# Patient Record
Sex: FEMALE | Race: BLACK OR AFRICAN AMERICAN | NOT HISPANIC OR LATINO | URBAN - METROPOLITAN AREA
[De-identification: names, ages, dates, MRNs, and addresses within clinical notes are randomized per-mention and may not be internally consistent; named-entity substitution may affect disease eponyms.]

---

## 2018-03-06 ENCOUNTER — OFFICE VISIT (OUTPATIENT)
Dept: INTERNAL MEDICINE | Facility: CLINIC | Age: 43
End: 2018-03-06

## 2018-03-06 VITALS
HEART RATE: 80 BPM | DIASTOLIC BLOOD PRESSURE: 80 MMHG | SYSTOLIC BLOOD PRESSURE: 140 MMHG | OXYGEN SATURATION: 98 % | WEIGHT: 220 LBS | BODY MASS INDEX: 31.57 KG/M2 | TEMPERATURE: 98.5 F

## 2018-03-06 DIAGNOSIS — M25.571 ACUTE RIGHT ANKLE PAIN: ICD-10-CM

## 2018-03-06 DIAGNOSIS — M54.2 NECK PAIN: ICD-10-CM
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
## 2018-03-06 DIAGNOSIS — G44.309 POST-TRAUMATIC HEADACHE, NOT INTRACTABLE, UNSPECIFIED CHRONICITY PATTERN: ICD-10-CM

## 2018-03-06 DIAGNOSIS — M25.512 ACUTE PAIN OF LEFT SHOULDER: ICD-10-CM

## 2018-03-06 PROCEDURE — 99213 OFFICE O/P EST LOW 20 MIN: CPT | Performed by: FAMILY MEDICINE

## 2018-03-06 RX ORDER — IBUPROFEN 800 MG/1
800 TABLET ORAL
COMMUNITY
Start: 2018-02-28 | End: 2018-03-10

## 2018-03-06 RX ORDER — CYCLOBENZAPRINE HCL 10 MG
10 TABLET ORAL
COMMUNITY
Start: 2018-02-28 | End: 2019-02-28
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
## 2018-03-14 ENCOUNTER — TREATMENT (OUTPATIENT)
Dept: PHYSICAL THERAPY | Facility: CLINIC | Age: 43
End: 2018-03-14

## 2018-03-14 DIAGNOSIS — M25.512 ACUTE PAIN OF LEFT SHOULDER: Primary | ICD-10-CM

## 2018-03-14 DIAGNOSIS — M54.2 PAIN, NECK: ICD-10-CM

## 2018-03-14 PROCEDURE — 97110 THERAPEUTIC EXERCISES: CPT | Performed by: PHYSICAL THERAPIST

## 2018-03-14 PROCEDURE — 97014 ELECTRIC STIMULATION THERAPY: CPT | Performed by: PHYSICAL THERAPIST

## 2018-03-14 PROCEDURE — 97161 PT EVAL LOW COMPLEX 20 MIN: CPT | Performed by: PHYSICAL THERAPIST
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
## 2018-03-20 ENCOUNTER — TREATMENT (OUTPATIENT)
Dept: PHYSICAL THERAPY | Facility: CLINIC | Age: 43
End: 2018-03-20

## 2018-03-20 DIAGNOSIS — M25.512 ACUTE PAIN OF LEFT SHOULDER: Primary | ICD-10-CM

## 2018-03-20 DIAGNOSIS — M54.2 PAIN, NECK: ICD-10-CM

## 2018-03-20 PROCEDURE — 97014 ELECTRIC STIMULATION THERAPY: CPT | Performed by: PHYSICAL THERAPIST

## 2018-03-20 PROCEDURE — 97140 MANUAL THERAPY 1/> REGIONS: CPT | Performed by: PHYSICAL THERAPIST

## 2018-03-20 PROCEDURE — 97110 THERAPEUTIC EXERCISES: CPT | Performed by: PHYSICAL THERAPIST

## 2018-03-20 NOTE — PROGRESS NOTES
Physical Therapy Daily Progress Note  Visits:2    Subjective : Judith Peralta reports: she is feeling better and feels like she can more her neck more than last week.  She is still having difficulty picking up her daughter and changing her.      Objective Extreme mm tenderness and swollen upper trap mm on her left side.     See Exercise, Manual, and Modality Logs for complete treatment.     Assessment/Plan:Pt tolerated cervical AROM in all directions in a seated position.  During shoulder flexion on the stability ball she started having pain at the end of her repetitions. Shoulder isometrics were completed in all directions with no pain. Pt was educated and given a hand out of the risks and benefits associated with dry needling and possible trial during next visit.   Progress per Plan of Care         Manual Therapy:    8     mins  33148;  Therapeutic Exercise:    25     mins  40233;     Neuromuscular Sommer:    -    mins  11651;    Therapeutic Activity:     -     mins  55286;     Gait Training:      -     mins  59765;     Ultrasound:     -     mins  62940;    Electrical Stimulation:    15     mins  52679 ( );  Dry Needling     -     mins self-pay    Timed Treatment:   33   mins   Total Treatment:     55   mins    I was present in the PT Department guiding the student by approving, concurring, and confirming the skilled judgement for all services rendered.     Zaire Flores PT  [place] License #[id number]    Physical Therapist

## 2018-03-22 ENCOUNTER — TREATMENT (OUTPATIENT)
Dept: PHYSICAL THERAPY | Facility: CLINIC | Age: 43
End: 2018-03-22

## 2018-03-22 DIAGNOSIS — M54.2 PAIN, NECK: ICD-10-CM

## 2018-03-22 DIAGNOSIS — M25.512 ACUTE PAIN OF LEFT SHOULDER: Primary | ICD-10-CM

## 2018-03-22 PROCEDURE — 97140 MANUAL THERAPY 1/> REGIONS: CPT | Performed by: PHYSICAL THERAPIST

## 2018-03-22 PROCEDURE — 97014 ELECTRIC STIMULATION THERAPY: CPT | Performed by: PHYSICAL THERAPIST

## 2018-03-22 PROCEDURE — 97110 THERAPEUTIC EXERCISES: CPT | Performed by: PHYSICAL THERAPIST

## 2018-03-22 PROCEDURE — 97530 THERAPEUTIC ACTIVITIES: CPT | Performed by: PHYSICAL THERAPIST

## 2018-03-22 NOTE — PROGRESS NOTES
Physical Therapy Daily Progress Note  Visits:3    Subjective : Judith Peralta reports: she is doing okay. She is still having stiffness and pain with movement but is able to move her head farther. She states that she is usually up all night taking care of her daughter and if she falls asleep it is propped up on the couch or a recliner.  She reports that she rarely sleeps in bed.     Objective Extreme tenderness over left upper trap mm belly upon palpation.   See Exercise, Manual, and Modality Logs for complete treatment.     Assessment/Plan:Pt reported increased pain and discomfort when performing AROM exercises today.  She was educated to decrease the ROM to a point before reaching end range where she would most likely have pain.  Exercises were attempted in supine in which the pt reported more discomfort therefore most were completed in a seated position. When performing shoulder isometrics she reported some tingling down into her hands.  She was shown shoulder pendulums to help provide some mobility to her left shoulder.  When performing she had difficulty relaxing demonstrate a maximal amount of tension in her L shoulder mms. She was educated on the importance of moving her shoulder to help with the healing process.   Progress per Plan of Care         Manual Therapy:   5    mins  67595;  Therapeutic Exercise:    25     mins  80006;     Neuromuscular Sommer:    -    mins  98785;    Therapeutic Activity:     8     mins  19464;  (pt edu on dry needling and movement of shoulder: 5 min)   Gait Training:      -     mins  76242;     Ultrasound:     -     mins  03332;    Electrical Stimulation:    15     mins  54492 ( );  Dry Needling     -     mins self-pay    Timed Treatment:   38   mins   Total Treatment:     55   mins    I was present in the PT Department guiding the student by approving, concurring, and confirming the skilled judgement for all services rendered.     Zaire Flores, PT  [place] License  #[id number]    Physical Therapist

## 2018-03-26 ENCOUNTER — TREATMENT (OUTPATIENT)
Dept: PHYSICAL THERAPY | Facility: CLINIC | Age: 43
End: 2018-03-26

## 2018-03-26 DIAGNOSIS — M54.2 PAIN, NECK: ICD-10-CM

## 2018-03-26 DIAGNOSIS — M25.512 ACUTE PAIN OF LEFT SHOULDER: Primary | ICD-10-CM

## 2018-03-26 PROCEDURE — 97530 THERAPEUTIC ACTIVITIES: CPT | Performed by: PHYSICAL THERAPIST

## 2018-03-26 PROCEDURE — 97140 MANUAL THERAPY 1/> REGIONS: CPT | Performed by: PHYSICAL THERAPIST

## 2018-03-26 PROCEDURE — 97110 THERAPEUTIC EXERCISES: CPT | Performed by: PHYSICAL THERAPIST

## 2018-03-26 PROCEDURE — 97014 ELECTRIC STIMULATION THERAPY: CPT | Performed by: PHYSICAL THERAPIST

## 2018-03-26 NOTE — PROGRESS NOTES
Physical Therapy Daily Progress Note  Visits:4    Subjective : Judith Peralta reports: over the weekend she would start to feel her neck mms tight up after completing daily activities. After treatment session last week she was sore.  She states she completed her exercises but still had difficulty performing the pendulums. Today at work she felt much worse at the end of the work day.     Objective Tightness over L upper trap mm.   See Exercise, Manual, and Modality Logs for complete treatment.     Assessment/Plan: Pt had less difficulty completing shoulder pendulums in a side to side motion than forward and backwards.  Performing small movements in sidelying and some STM in sidelying was completed.  She felt her mm pull with minimal movement in all directions.  Improvement can be seen with left shoulder ROM during pendulums. Pt educated on continued movement especially pendulums at home. During next visit ultrasound will be completed to her left upper trap mm as well as taping for mm inhibition.  Progress per Plan of Care         Manual Therapy:    12     mins  86734;  Therapeutic Exercise:    30    mins  25007;     Neuromuscular Sommer:    -    mins  67825;    Therapeutic Activity:     12     mins  41489;  (pt edu on and visuals on mechanism of injury and why moving is important, as well as exercises to continue at home to help with ROM and overall movement)  Gait Training:      -     mins  81591;     Ultrasound:     -     mins  22643;    Electrical Stimulation:    15     mins  74887 ( );  Dry Needling     -     mins self-pay    Timed Treatment:   54   mins   Total Treatment:     80   mins    I was present in the PT Department guiding the student by approving, concurring, and confirming the skilled judgement for all services rendered.     Zaire Flores, PT  [place] License #[id number]    Physical Therapist

## 2018-03-28 ENCOUNTER — TREATMENT (OUTPATIENT)
Dept: PHYSICAL THERAPY | Facility: CLINIC | Age: 43
End: 2018-03-28

## 2018-03-28 DIAGNOSIS — M25.512 ACUTE PAIN OF LEFT SHOULDER: Primary | ICD-10-CM

## 2018-03-28 DIAGNOSIS — M54.2 PAIN, NECK: ICD-10-CM

## 2018-03-28 PROCEDURE — 97035 APP MDLTY 1+ULTRASOUND EA 15: CPT | Performed by: PHYSICAL THERAPIST

## 2018-03-28 PROCEDURE — 97140 MANUAL THERAPY 1/> REGIONS: CPT | Performed by: PHYSICAL THERAPIST

## 2018-03-28 PROCEDURE — 97110 THERAPEUTIC EXERCISES: CPT | Performed by: PHYSICAL THERAPIST

## 2018-03-28 NOTE — PROGRESS NOTES
Physical Therapy Daily Progress Note  Visits:5    Subjective : Judith Peralta reports: she still has soreness in her neck after and during work but feels that she is able to move a little better.  She can tell that she is still guarding and afraid to move her neck.     Objective Tenderness over her L upper trap. Mm belly.     See Exercise, Manual, and Modality Logs for complete treatment.     Assessment/Plan:Pt still has difficulty performing shoulder pendulums but improvement can be seen with the passive movement occurring at her shoulder.  AROM was completed at her cervical spine with no pain. An ultrasound was performed over her L upper trap which pt tolerated well with light pressure of the ultrasound head.    Progress per Plan of Care         Manual Therapy:    8     mins  27371; (k taping for inhibition of (L) upper trap and rotator cuff mm )  Therapeutic Exercise:    17   mins  14431;     Neuromuscular Sommer:    -    mins  78157;    Therapeutic Activity:     5     mins  02627; (continued pt edu. To perform active neck and shoulder motion)    Gait Training:      -     mins  81216;     Ultrasound:     8    mins  52723;    Electrical Stimulation:    -     mins  19195 ( );  Dry Needling     -     mins self-pay    Timed Treatment:   38  mins   Total Treatment:    48   mins    I was present in the PT Department guiding the student by approving, concurring, and confirming the skilled judgement for all services rendered.     Zaire Flores PT  [place] License #[id number]    Physical Therapist

## 2018-03-30 ENCOUNTER — TREATMENT (OUTPATIENT)
Dept: PHYSICAL THERAPY | Facility: CLINIC | Age: 43
End: 2018-03-30

## 2018-03-30 DIAGNOSIS — M25.512 ACUTE PAIN OF LEFT SHOULDER: Primary | ICD-10-CM

## 2018-03-30 DIAGNOSIS — M54.2 PAIN, NECK: ICD-10-CM

## 2018-03-30 PROCEDURE — 97140 MANUAL THERAPY 1/> REGIONS: CPT | Performed by: PHYSICAL THERAPIST

## 2018-03-30 PROCEDURE — 97035 APP MDLTY 1+ULTRASOUND EA 15: CPT | Performed by: PHYSICAL THERAPIST

## 2018-03-30 PROCEDURE — 97110 THERAPEUTIC EXERCISES: CPT | Performed by: PHYSICAL THERAPIST

## 2018-03-30 PROCEDURE — 97014 ELECTRIC STIMULATION THERAPY: CPT | Performed by: PHYSICAL THERAPIST

## 2018-03-30 NOTE — PROGRESS NOTES
Physical Therapy Daily Progress Note  Visits:6    Subjective : Judith Peralta reports: the taping felt good but she does not want to do it during this treatment session due to Easter.  She states overall she is noticing improvement with her ROM.     Objective Improved L scapular PROM.     See Exercise, Manual, and Modality Logs for complete treatment.     Assessment/Plan:Pt completed side to side shoulder pendulums with more movement at her shoulder due to more relaxation of the mm. During STM with PROM in all direction at her L scapula she tolerated the movement stating it felt good.  At the second tx session she had pain with minimal PROM of her scapula. Overall her ROM is showing improvement as well as her pain and the overall tightness and mm guarding at her L shoulder and neck.   Progress per Plan of Care and Progress strengthening /stabilization /functional activity         Manual Therapy:    10     mins  51431;  Therapeutic Exercise:    20     mins  32250;     Neuromuscular Sommer:    -    mins  55169;    Therapeutic Activity:     -     mins  04682;     Gait Training:      -     mins  15967;     Ultrasound:     8     mins  13125;    Electrical Stimulation:    15     mins  91844 ( );  Dry Needling     -     mins self-pay    Timed Treatment:   53   mins   Total Treatment:     65   mins    I was present in the PT Department guiding the student by approving, concurring, and confirming the skilled judgement for all services rendered.     Zaire Flores PT  [place] License #[id number]    Physical Therapist

## 2018-04-02 ENCOUNTER — TREATMENT (OUTPATIENT)
Dept: PHYSICAL THERAPY | Facility: CLINIC | Age: 43
End: 2018-04-02

## 2018-04-02 DIAGNOSIS — M25.512 ACUTE PAIN OF LEFT SHOULDER: Primary | ICD-10-CM

## 2018-04-02 DIAGNOSIS — M54.2 PAIN, NECK: ICD-10-CM

## 2018-04-02 PROCEDURE — 97035 APP MDLTY 1+ULTRASOUND EA 15: CPT | Performed by: PHYSICAL THERAPIST

## 2018-04-02 PROCEDURE — 97140 MANUAL THERAPY 1/> REGIONS: CPT | Performed by: PHYSICAL THERAPIST

## 2018-04-02 PROCEDURE — 97014 ELECTRIC STIMULATION THERAPY: CPT | Performed by: PHYSICAL THERAPIST

## 2018-04-02 PROCEDURE — 97110 THERAPEUTIC EXERCISES: CPT | Performed by: PHYSICAL THERAPIST

## 2018-04-02 NOTE — PROGRESS NOTES
Physical Therapy Daily Progress Note  Visits:7    Subjective : Judith Peralta reports: her movement overall has improved but she was really sore after the treatment session on Friday.  She states the STM and movement into protraction and retraction at her L shoulder felt good during the session but the next day she was really sore.       Objective Mild tenderness over L upper and middle trap.   Decreased mobility at the T8-T12 assess with light PAs.   See Exercise, Manual, and Modality Logs for complete treatment.     Assessment/Plan: Pt continues to show better overall movement in all directions with less m. Guarding in a relaxed position. When performing PA's at her lower thoracic spine pt reported a tenderness on her left side around her scapula. She performed scapular movements with no pain or discomfort.  When she is in seated position completing cervical AROM she states her neck gets stiff and she gets a tingling down into the lateral aspect of her L arm.   Progress per Plan of Care and Progress strengthening /stabilization /functional activity         Manual Therapy:   15   mins  53309; (5 min K-tape for inhibition of L upper and middle trap mm.)  Therapeutic Exercise:    26     mins  95415;     Neuromuscular Sommer:    -    mins  42952;    Therapeutic Activity:     5     mins  11690;   (pt edu on dry needling benefits and HEP)   Gait Training:      -     mins  23864;     Ultrasound:     8     mins  01516;    Electrical Stimulation:    15     mins  62322 ( );  Dry Needling     -     mins self-pay    Timed Treatment:   54   mins   Total Treatment:     75   mins    I was present in the PT Department guiding the student by approving, concurring, and confirming the skilled judgement for all services rendered.     Ayanna Bryant, PT  [place] License #[id number]    Physical Therapist

## 2018-04-05 ENCOUNTER — TREATMENT (OUTPATIENT)
Dept: PHYSICAL THERAPY | Facility: CLINIC | Age: 43
End: 2018-04-05

## 2018-04-05 DIAGNOSIS — M54.2 PAIN, NECK: ICD-10-CM

## 2018-04-05 DIAGNOSIS — M25.512 ACUTE PAIN OF LEFT SHOULDER: Primary | ICD-10-CM

## 2018-04-05 PROCEDURE — 97014 ELECTRIC STIMULATION THERAPY: CPT | Performed by: PHYSICAL THERAPIST

## 2018-04-05 PROCEDURE — 97035 APP MDLTY 1+ULTRASOUND EA 15: CPT | Performed by: PHYSICAL THERAPIST

## 2018-04-05 PROCEDURE — 97110 THERAPEUTIC EXERCISES: CPT | Performed by: PHYSICAL THERAPIST

## 2018-04-05 NOTE — PROGRESS NOTES
Physical Therapy Daily Progress Note  Visits:8    Subjective : Judith Peralta reports: she has had a difficult few days at work and home. She has been very busy which she states has caused overall body soreness. She reported that her taping helped and she would like to do it again next week.      Objective   See Exercise, Manual, and Modality Logs for complete treatment.     Assessment/Plan:Due to pt increased body soreness and discomfort exercises were kept to just AROM and pendulums.  A 2# ankle weight was placed around her wrist during pendulums to help increase motion.  It was discussed with pt looking into getting a heating pad at home to help with muscle relaxation.   Progress per Plan of Care         Manual Therapy:    -     mins  15884;  Therapeutic Exercise:    22     mins  48090;     Neuromuscular Sommer:    -    mins  41118;    Therapeutic Activity:     8     mins  47494;     Gait Training:      -     mins  91542;     Ultrasound:     8     mins  18887;    Electrical Stimulation:    15     mins  89867 ( );  Dry Needling     -     mins self-pay    Timed Treatment:   38   mins   Total Treatment:     60   mins    I was present in the PT Department guiding the student by approving, concurring, and confirming the skilled judgement for all services rendered.     Ayanna Bryant, PT  [place] License #[id number]    Physical Therapist

## 2018-04-10 ENCOUNTER — TREATMENT (OUTPATIENT)
Dept: PHYSICAL THERAPY | Facility: CLINIC | Age: 43
End: 2018-04-10

## 2018-04-10 DIAGNOSIS — M25.512 ACUTE PAIN OF LEFT SHOULDER: Primary | ICD-10-CM

## 2018-04-10 DIAGNOSIS — M54.2 PAIN, NECK: ICD-10-CM

## 2018-04-10 PROCEDURE — 97110 THERAPEUTIC EXERCISES: CPT | Performed by: PHYSICAL THERAPIST

## 2018-04-10 PROCEDURE — 97035 APP MDLTY 1+ULTRASOUND EA 15: CPT | Performed by: PHYSICAL THERAPIST

## 2018-04-10 PROCEDURE — 97530 THERAPEUTIC ACTIVITIES: CPT | Performed by: PHYSICAL THERAPIST
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
## 2018-04-10 NOTE — PATIENT INSTRUCTIONS
After reviewing all risk of dry needling (including pneumothorax, bruising, infection, nerve injury, and soreness), written informed consent was obtained.  Manual palpation and assessment performed before, during and after dry needling session.  Clean needle technique observed at all times, precautions for lung fields, neurovascular structures observed.

## 2018-04-10 NOTE — PROGRESS NOTES
Physical Therapy Daily Progress Note  Visits:9    Subjective : Judith Peralta reports: she talked to her  yesterday about dry needling costs being covered from [employer].  Therefore she would like to go ahead with dry needling today. She states over the weekend she felt like she was starting to get more movement in her neck.     Objective   See Exercise, Manual, and Modality Logs for complete treatment.     Assessment/Plan:  ...  Pt tolerated dry needling initiation well and was instructed to be mindful of symptoms to determine if any symptom alleviation was achieved.  We did not aggressively needle to determine her level of tolerance due to the long standing duration of mm spasm, hypertrophy and inflammation.      Progress per Plan of Care and Progress strengthening /stabilization /functional activity         Manual Therapy:    -     mins  04937;  Therapeutic Exercise:    -     mins  27378;     Neuromuscular Sommer:    -    mins  24563;    Therapeutic Activity:     -     mins  20935;     Gait Training:      -     mins  20046;     Ultrasound:     -     mins  78615;    Electrical Stimulation:    -     mins  22909 ( );  Dry Needling     15     mins self-pay (including education on procedure)   Timed Treatment:   -   mins   Total Treatment:     -   mins    I was present in the PT Department guiding the student by approving, concurring, and confirming the skilled judgement for all services rendered.     Zaire Flores PT  [place] License #[id number]    Physical Therapist

## 2018-04-12 ENCOUNTER — TREATMENT (OUTPATIENT)
Dept: PHYSICAL THERAPY | Facility: CLINIC | Age: 43
End: 2018-04-12

## 2018-04-12 DIAGNOSIS — M54.2 PAIN, NECK: Primary | ICD-10-CM

## 2018-04-12 DIAGNOSIS — M25.512 ACUTE PAIN OF LEFT SHOULDER: ICD-10-CM

## 2018-04-12 PROCEDURE — DRYNDL PR CUSTOM DRY NEEDLING SELF PAY: Performed by: PHYSICAL THERAPIST

## 2018-04-12 PROCEDURE — 97110 THERAPEUTIC EXERCISES: CPT | Performed by: PHYSICAL THERAPIST

## 2018-04-12 PROCEDURE — 97140 MANUAL THERAPY 1/> REGIONS: CPT | Performed by: PHYSICAL THERAPIST

## 2018-04-12 NOTE — PROGRESS NOTES
Physical Therapy Daily Progress Note  Visits:10    Subjective : Judith Peralta reports: the dry needling made her very sore and she did not like it but is welling to continue if it is beneficial. She states her shoulder ROM continues to improve but her neck and back get really tight when sitting for too long at work.     Objective Decreased tenderness and tightness over her L upper trap with palpation.   See Exercise, Manual, and Modality Logs for complete treatment.     Assessment/Plan:When performing pendulums pt is able to get more passive movement at her L shoulder. Pt shown a doorway pec stretch, MTBB, upper trap and a S/L rotation stretch to complete at home and at work. She reported that the pec stretch felt good and that is was stretching where she is most sore. Pt was edu on heating her L shoulder and neck following dry needling and to avoid ice.    Progress per Plan of Care and Progress strengthening /stabilization /functional activity         Manual Therapy:    8     mins  95810;  Therapeutic Exercise:    26     mins  39931;     Neuromuscular Sommer:    -    mins  44188;    Therapeutic Activity:     8     mins  87327;     Gait Training:      -     mins  58620;     Ultrasound:     -     mins  10282;    Electrical Stimulation:    -     mins  29773 ( );  Dry Needling     15     mins self-pay (completed by Zaire Flores DPT)     Timed Treatment:   42   mins   Total Treatment:     67   mins    I was present in the PT Department guiding the student by approving, concurring, and confirming the skilled judgement for all services rendered.     MIKE Muñiz License #[id number]    Physical Therapist

## 2018-04-20 ENCOUNTER — TREATMENT (OUTPATIENT)
Dept: PHYSICAL THERAPY | Facility: CLINIC | Age: 43
End: 2018-04-20

## 2018-04-20 DIAGNOSIS — M54.2 PAIN, NECK: Primary | ICD-10-CM

## 2018-04-20 DIAGNOSIS — M25.512 ACUTE PAIN OF LEFT SHOULDER: ICD-10-CM

## 2018-04-20 PROCEDURE — 97140 MANUAL THERAPY 1/> REGIONS: CPT | Performed by: PHYSICAL THERAPIST

## 2018-04-20 PROCEDURE — 97110 THERAPEUTIC EXERCISES: CPT | Performed by: PHYSICAL THERAPIST

## 2018-04-20 PROCEDURE — 97014 ELECTRIC STIMULATION THERAPY: CPT | Performed by: PHYSICAL THERAPIST
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
## 2018-04-24 ENCOUNTER — TREATMENT (OUTPATIENT)
Dept: PHYSICAL THERAPY | Facility: CLINIC | Age: 43
End: 2018-04-24

## 2018-04-24 DIAGNOSIS — M25.512 ACUTE PAIN OF LEFT SHOULDER: ICD-10-CM

## 2018-04-24 DIAGNOSIS — M54.2 PAIN, NECK: Primary | ICD-10-CM

## 2018-04-24 PROCEDURE — 97110 THERAPEUTIC EXERCISES: CPT | Performed by: PHYSICAL THERAPIST

## 2018-04-24 PROCEDURE — 97140 MANUAL THERAPY 1/> REGIONS: CPT | Performed by: PHYSICAL THERAPIST

## 2018-04-24 NOTE — PROGRESS NOTES
Physical Therapy Daily Progress Note  Visits:12    Subjective : Judith Peralta reports: had a good weekend with some soreness in her L shoulder which she states is probably from moving her shoulder more.  She started taking some of her pain meds.     Objective : Pt reports mild tenderness with palpation to her L upper trap.   See Exercise, Manual, and Modality Logs for complete treatment.     Assessment/Plan:Pt tolerated Nu-Step for 8 min before getting discomfort in her L shoulder. When completing the pulleys she felt more of a stretch going into the flexion plane. Rows with the red t-band were completed and pt did not report any pain or discomfort. She is planning on having dry needling completed at her L upper trap during the next treatment session.   Progress per Plan of Care and Progress strengthening /stabilization /functional activity         Manual Therapy:    8     mins  60304;  Therapeutic Exercise:    28     mins  20915;     Neuromuscular Sommer:    -    mins  41447;    Therapeutic Activity:     8     mins  07734;     Gait Training:      -     mins  95574;     Ultrasound:     -     mins  93685;    Electrical Stimulation:    15     mins  03522 ( );  Dry Needling     -     mins self-pay    Timed Treatment:   44   mins   Total Treatment:     59   mins    I was present in the PT Department guiding the student by approving, concurring, and confirming the skilled judgement for all services rendered.     Zaire Flores, PT  [place] License #[id number]    Physical Therapist

## 2018-04-26 ENCOUNTER — TREATMENT (OUTPATIENT)
Dept: PHYSICAL THERAPY | Facility: CLINIC | Age: 43
End: 2018-04-26

## 2018-04-26 DIAGNOSIS — M25.512 ACUTE PAIN OF LEFT SHOULDER: ICD-10-CM

## 2018-04-26 DIAGNOSIS — M54.2 PAIN, NECK: Primary | ICD-10-CM

## 2018-04-26 PROCEDURE — DRYNDL PR CUSTOM DRY NEEDLING SELF PAY: Performed by: PHYSICAL THERAPIST

## 2018-04-26 NOTE — PROGRESS NOTES
Physical Therapy Daily Progress Note  Visits:13    Subjective : Judith Peralta reports: I'm slowly improving with my ability to use the arm and turn the head.  The last dry needling session with the shorter needles made me a little less sore.      Objective   See Exercise, Manual, and Modality Logs for complete treatment.     Assessment/Plan:Pt tolerated treatment well today and we were to progress some of the needling techniques including mild tenting.  Pt reports having a good stretch on the muscles today and no excessive soreness from the session.   Progress per Plan of Care     Manual Therapy:    8     mins  32228;  Therapeutic Exercise:    -     mins  82358;     Neuromuscular Sommer:    -    mins  55277;    Therapeutic Activity:     -     mins  05183;     Gait Training:      -     mins  22824;     Ultrasound:     15     mins  18244;    Electrical Stimulation:    -     mins  13417 ( );  Dry Needling     -     mins self-pay    Timed Treatment:   23   mins   Total Treatment:     33   mins      MIKE Muñiz License #[id number]    Physical Therapist

## 2018-05-02 ENCOUNTER — TREATMENT (OUTPATIENT)
Dept: PHYSICAL THERAPY | Facility: CLINIC | Age: 43
End: 2018-05-02

## 2018-05-02 DIAGNOSIS — M25.512 ACUTE PAIN OF LEFT SHOULDER: ICD-10-CM

## 2018-05-02 DIAGNOSIS — M54.2 PAIN, NECK: Primary | ICD-10-CM

## 2018-05-02 PROCEDURE — 97140 MANUAL THERAPY 1/> REGIONS: CPT | Performed by: PHYSICAL THERAPIST

## 2018-05-02 PROCEDURE — 97035 APP MDLTY 1+ULTRASOUND EA 15: CPT | Performed by: PHYSICAL THERAPIST

## 2018-05-02 PROCEDURE — 97110 THERAPEUTIC EXERCISES: CPT | Performed by: PHYSICAL THERAPIST

## 2018-05-02 NOTE — PROGRESS NOTES
Physical Therapy Daily Progress Note  Visits:14    Subjective : Judith Peralta reports: I did pretty good over the last week.  I'm feeling a little better and using the arm more but if I'm sitting too long or doing arm activity I can feel the arm getting sore.      Objective:    See Exercise, Manual, and Modality Logs for complete treatment.     Assessment/Plan:Pt is moving with better efficiency and less guarding but observable mm tension still noted primarily to the L upper trap mm in the center 1/3.  Shoulder continues to position moderately anteriorly but tolerated mild TE progression.  She did have some difficulty with wall slides due to shoulder irritation.      Progress per Plan of Care and Progress strengthening /stabilization /functional activity       Manual Therapy:    10     mins  05299;  Therapeutic Exercise:    35     mins  82260;     Neuromuscular Sommer:    -    mins  54654;    Therapeutic Activity:     -     mins  13764;     Gait Training:      -     mins  74299;     Ultrasound:     8     mins  56918;    Electrical Stimulation:    -     mins  84717 ( );  Dry Needling     -     mins self-pay    Timed Treatment:   53   mins   Total Treatment:     53   mins      MIKE Muñiz License #[id number]    Physical Therapist

## 2018-05-08 ENCOUNTER — TREATMENT (OUTPATIENT)
Dept: PHYSICAL THERAPY | Facility: CLINIC | Age: 43
End: 2018-05-08

## 2018-05-08 DIAGNOSIS — M25.512 ACUTE PAIN OF LEFT SHOULDER: ICD-10-CM

## 2018-05-08 DIAGNOSIS — M54.2 PAIN, NECK: Primary | ICD-10-CM

## 2018-05-08 PROCEDURE — 97035 APP MDLTY 1+ULTRASOUND EA 15: CPT | Performed by: PHYSICAL THERAPIST

## 2018-05-08 PROCEDURE — 97140 MANUAL THERAPY 1/> REGIONS: CPT | Performed by: PHYSICAL THERAPIST

## 2018-05-08 PROCEDURE — 97110 THERAPEUTIC EXERCISES: CPT | Performed by: PHYSICAL THERAPIST

## 2018-05-08 NOTE — PROGRESS NOTES
Physical Therapy Daily Progress Note  Visits:15    Subjective : Judith Peralta reports: I did a lot of spring cleaning this weekend and did okay while I was doing it, I just had some pain after the fact.  It felt mostly tight, but some occasional sharp pain.      Objective   See Exercise, Manual, and Modality Logs for complete treatment.     Assessment/Plan:Pt continues to have L sided mm guarding but less noticeable mm bulk and banding of upper trap mm.  Will continue to utilize modalities to help with manual interventions to promote better mobility.  She reported less restriction following manual therapy and was able to perform 15x scaption plane wall slides without much limitation.        Progress per Plan of Care and Progress strengthening /stabilization /functional activity     Manual Therapy:    12     mins  25245;  Therapeutic Exercise:    35     mins  80767;     Neuromuscular Sommer:    -    mins  53687;    Therapeutic Activity:     -     mins  03734;     Gait Training:      -     mins  26824;     Ultrasound:     8     mins  04642;    Electrical Stimulation:    -     mins  04046 ( );  Dry Needling     -     mins self-pay    Timed Treatment:   55   mins   Total Treatment:     55   mins      MIKE Muñiz License #[id number]    Physical Therapist

## 2018-05-10 ENCOUNTER — TREATMENT (OUTPATIENT)
Dept: PHYSICAL THERAPY | Facility: CLINIC | Age: 43
End: 2018-05-10

## 2018-05-10 DIAGNOSIS — M25.512 ACUTE PAIN OF LEFT SHOULDER: ICD-10-CM

## 2018-05-10 DIAGNOSIS — M54.2 PAIN, NECK: Primary | ICD-10-CM

## 2018-05-10 PROCEDURE — DRYNDL PR CUSTOM DRY NEEDLING SELF PAY: Performed by: PHYSICAL THERAPIST

## 2018-05-10 NOTE — PROGRESS NOTES
Physical Therapy Daily Progress Note  Visits:16    Subjective : Judith Peralta reports: I do have to run to a meeting shortly after PT visit.  I have to leave a little early if that's okay.  The (manual therapy) for my upper back has been very helpful.  I'd like to do that again next visit.       Objective :   See Exercise, Manual, and Modality Logs for complete treatment.     Assessment/Plan:Pt did have a little more discomfort with needling session today, possibly due to duration of time between sessions.  We used the same size filaments as last session and applied to same points (generally).  We continue to work on posture and attempting mm relaxation techniques.      Progress per Plan of Care     Manual Therapy:    10     mins  04552;  Therapeutic Exercise:    4     mins  56293;     Neuromuscular Sommer:    -    mins  05011;    Therapeutic Activity:     -     mins  55555;     Gait Training:      -     mins  29311;     Ultrasound:     -     mins  61891;    Electrical Stimulation:    -     mins  45891 ( );  Dry Needling     15     mins self-pay    Timed Treatment:   29   mins   Total Treatment:     39   mins      MIKE Muñiz License #[id number]    Physical Therapist

## 2018-05-14 ENCOUNTER — TREATMENT (OUTPATIENT)
Dept: PHYSICAL THERAPY | Facility: CLINIC | Age: 43
End: 2018-05-14

## 2018-05-14 DIAGNOSIS — M25.512 ACUTE PAIN OF LEFT SHOULDER: ICD-10-CM

## 2018-05-14 DIAGNOSIS — M54.2 PAIN, NECK: Primary | ICD-10-CM

## 2018-05-14 PROCEDURE — 97035 APP MDLTY 1+ULTRASOUND EA 15: CPT | Performed by: PHYSICAL THERAPIST

## 2018-05-14 PROCEDURE — 97110 THERAPEUTIC EXERCISES: CPT | Performed by: PHYSICAL THERAPIST

## 2018-05-14 PROCEDURE — 97140 MANUAL THERAPY 1/> REGIONS: CPT | Performed by: PHYSICAL THERAPIST

## 2018-05-14 PROCEDURE — 97014 ELECTRIC STIMULATION THERAPY: CPT | Performed by: PHYSICAL THERAPIST

## 2018-05-14 NOTE — PROGRESS NOTES
Physical Therapy Daily Progress Note  Visits:17    Subjective : Judith Peralta reports: I had a really good weekend.  I was busy but the shoulder didn't feel too bad.      Objective;   See Exercise, Manual, and Modality Logs for complete treatment.     Assessment/Plan:Pt tolerated manual intervention well but the L GH joint continues to position forward and has discomfort with repetitive movement patterns.      Progress per Plan of Care and Progress strengthening /stabilization /functional activity     Manual Therapy:    12     mins  27144;  Therapeutic Exercise:    30     mins  86872;     Neuromuscular Sommer:    -    mins  59188;    Therapeutic Activity:     -     mins  01880;     Gait Training:      -     mins  99576;     Ultrasound:     8     mins  70208;    Electrical Stimulation:    15     mins  59237 ( );  Dry Needling     -     mins self-pay    Timed Treatment:   50   mins   Total Treatment:     65   mins      MIKE Muñiz License #[id number]    Physical Therapist

## 2018-05-17 ENCOUNTER — TREATMENT (OUTPATIENT)
Dept: PHYSICAL THERAPY | Facility: CLINIC | Age: 43
End: 2018-05-17

## 2018-05-17 DIAGNOSIS — M54.2 PAIN, NECK: Primary | ICD-10-CM

## 2018-05-17 DIAGNOSIS — M25.512 ACUTE PAIN OF LEFT SHOULDER: ICD-10-CM

## 2018-05-17 PROCEDURE — 97110 THERAPEUTIC EXERCISES: CPT | Performed by: PHYSICAL THERAPIST

## 2018-05-17 PROCEDURE — 97014 ELECTRIC STIMULATION THERAPY: CPT | Performed by: PHYSICAL THERAPIST

## 2018-05-17 PROCEDURE — 97140 MANUAL THERAPY 1/> REGIONS: CPT | Performed by: PHYSICAL THERAPIST

## 2018-05-18 NOTE — PROGRESS NOTES
Physical Therapy Daily Progress Note  Visits:18    Subjective : Judith Peralta reports: I've had a decent few days but I can tell the shoulder gets tight after a few days  Not having treatment.     Objective : Decreasing L upper trap mm guard.  Less forward shoulder on L.    See Exercise, Manual, and Modality Logs for complete treatment.     Assessment/Plan:Pt tolerated TE modifications very well reporting feeling much better after session.  Today we focused more on thoracic spine and C-spine mobility vs L GH of last few visits.  Additional HEP provided to support increased axial skeleton mobility.      Progress per Plan of Care and Progress strengthening /stabilization /functional activity       Manual Therapy:    20     mins  88047;  Therapeutic Exercise:    25     mins  64749;     Neuromuscular Sommer:    -    mins  13813;    Therapeutic Activity:     -     mins  43915;     Gait Training:      -     mins  59963;     Ultrasound:     -     mins  19574;    Electrical Stimulation:    15     mins  73113 ( );  Dry Needling     -     mins self-pay    Timed Treatment:   45   mins   Total Treatment:     70   mins    MIKE Muñiz License #[id number]    Physical Therapist

## 2018-05-23 ENCOUNTER — TREATMENT (OUTPATIENT)
Dept: PHYSICAL THERAPY | Facility: CLINIC | Age: 43
End: 2018-05-23

## 2018-05-23 DIAGNOSIS — M25.512 ACUTE PAIN OF LEFT SHOULDER: ICD-10-CM

## 2018-05-23 DIAGNOSIS — M54.2 PAIN, NECK: Primary | ICD-10-CM

## 2018-05-23 PROCEDURE — 97530 THERAPEUTIC ACTIVITIES: CPT | Performed by: PHYSICAL THERAPIST

## 2018-05-23 PROCEDURE — 97110 THERAPEUTIC EXERCISES: CPT | Performed by: PHYSICAL THERAPIST

## 2018-05-23 PROCEDURE — 97140 MANUAL THERAPY 1/> REGIONS: CPT | Performed by: PHYSICAL THERAPIST

## 2018-05-23 NOTE — PROGRESS NOTES
Physical Therapy Progress Note  Visits:19    Subjective : Judith Peralta reports: I slipped and fell on a slick wet spot over the weekend and scraped up my knee pretty good.  I didn't suffer any joint or muscle injury just skin scrapes.  Luckily, I didn't have any shoulder or neck injury that I know of.  Generally speaking, I am doing much better and I only have mild limitations in the neck and shoulder but I feel the most pain after doing activity, not as much during.    Pain: Neck: 1/10 at rest, worst: 5/10.  Shoulder: 2/10 at rest, 6/10 at worst with use.    Outcomes measures: DASH: 22.5 % (at eval: 81.82) Neck index: 10% (at eval: 54 %)     Objective   Active Range of Motion   Cervical/Thoracic Spine   Cervical     Flexion: 30 degrees, At eval: 29 degrees   Extension: 35 degrees, At eval:20 degrees   Left lateral flexion: 32 degrees,At eval: 31 degrees with pain  Right lateral flexion: 35 degrees, At eval:24 degrees with pain  Left rotation: 55 degrees, At eval:40 degrees with pain  Right rotation: 52 degrees , At eval:20 degrees with pain     Right Shoulder   Normal active range of motion     Passive Range of Motion (at evaluation)   Left Shoulder   Flexion: 64 degrees with pain  Abduction: 60 degrees     5-23-18: AROM   L shoulder flexion: 140 degrees   L Shoulder scaption/abduction: 145     Additional Passive Range of Motion Details  Limited ER and IR at left shoulder and increased pain with movement.     Joint Play   Left Shoulder  Hypomobile in the anterior capsule, posterior capsule and inferior capsule.     Right Shoulder  Hypomobile in the 1st rib.     Additional Joint Play Details  Left shoulder jt hypomobile due to mild mm guarding following acute injury.     Strength/Myotome Testing   Cervical Spine   Neck extension: 4/5  Neck flexion: 4/5  Neck SB: 4+/5  Neck Rot: 4/5     Left Shoulder      Planes of Motion   Flexion: 4   Extension: 4+   Abduction: 4   External rotation at 0°: 4+   Internal rotation  at 0°: 4     Isolated Muscles   Biceps: 4     Left Elbow   Flexion: 4+  Extension: 4     Right Elbow   Flexion: 5-  Extension: 4+     Left Wrist/Hand   Wrist extension: 4+  Wrist flexion: 4+     Right Wrist/Hand   Wrist extension: 4+  Wrist flexion: 4+     See Exercise, Manual, and Modality Logs for complete treatment.     Assessment & Plan     Assessment  Impairments: abnormal muscle tone, abnormal or restricted ROM, activity intolerance, impaired physical strength and pain with function  Assessment details: Judith has made good improvements with PT and has experienced the most improvement over the last month.  Her mm guarding is significantly lessened and she tolerates firm manual intervention of the joints and soft tissues. She still exhibits noted C-spine and shoulder ROM limitations although improved.  From an overall function standpoint, I believe more focus on strength training and posture is warranted.  With improved posture and UE strength, she may demonstrate less substitution patterns with arm elevation and functional reach of UE.     Prognosis: good  Functional Limitations: carrying objects, lifting, pushing, uncomfortable because of pain, reaching behind back, reaching overhead and unable to perform repetitive tasks  Goals  Plan Goals: Progress toward all goals.      Plan  Therapy options: will be seen for skilled physical therapy services  Planned modality interventions: cryotherapy, TENS and ultrasound  Other planned modality interventions: Dry needling PRN  Planned therapy interventions: manual therapy, neuromuscular re-education, postural training, soft tissue mobilization, spinal/joint mobilization, strengthening, stretching, therapeutic activities, joint mobilization, IADL retraining, home exercise program, functional ROM exercises, flexibility and body mechanics training  Frequency: 2x week  Duration in weeks: 4  Treatment plan discussed with: patient      Progress per Plan of Care and Progress  strengthening /stabilization /functional activity         Manual Therapy:    12     mins  86231;  Therapeutic Exercise:    35     mins  23143;     Neuromuscular Sommer:    -    mins  18850;    Therapeutic Activity:     10     mins  87595;     Gait Training:      -     mins  77771;     Ultrasound:     -     mins  90822;    Electrical Stimulation:    -     mins  29593 ( );  Dry Needling     -     mins self-pay    Timed Treatment:   57   mins   Total Treatment:     67   mins    MIKE Muñiz License #[id number]    Physical Therapist

## 2018-05-25 ENCOUNTER — TREATMENT (OUTPATIENT)
Dept: PHYSICAL THERAPY | Facility: CLINIC | Age: 43
End: 2018-05-25

## 2018-05-25 DIAGNOSIS — M54.2 PAIN, NECK: Primary | ICD-10-CM

## 2018-05-25 DIAGNOSIS — M25.512 ACUTE PAIN OF LEFT SHOULDER: ICD-10-CM

## 2018-05-25 PROCEDURE — 97140 MANUAL THERAPY 1/> REGIONS: CPT | Performed by: PHYSICAL THERAPIST

## 2018-05-25 PROCEDURE — 97110 THERAPEUTIC EXERCISES: CPT | Performed by: PHYSICAL THERAPIST

## 2018-05-25 NOTE — PROGRESS NOTES
Physical Therapy Daily Progress Note  Visits:20    Subjective : Judith Peralta reports: The knee still hurts after my fall last weekend but I was able to get all of my home exercises in this.      Objective:   See Exercise, Manual, and Modality Logs for complete treatment.     Assessment/Plan:Pt did well with PT intervention and progression of strength training activity.      Progress per Plan of Care and Progress strengthening /stabilization /functional activity     Manual Therapy:    18     mins  45384;  Therapeutic Exercise:    40     mins  18158;     Neuromuscular Sommer:    -    mins  41770;    Therapeutic Activity:     -     mins  13138;     Gait Training:      -     mins  92283;     Ultrasound:     -     mins  70189;    Electrical Stimulation:    -     mins  94687 ( );  Dry Needling     -     mins self-pay    Timed Treatment:   58   mins   Total Treatment:     68   mins    Zaire Flores PT  [place] License #[id number]    Physical Therapist

## 2018-05-30 ENCOUNTER — TREATMENT (OUTPATIENT)
Dept: PHYSICAL THERAPY | Facility: CLINIC | Age: 43
End: 2018-05-30

## 2018-05-30 DIAGNOSIS — M25.512 ACUTE PAIN OF LEFT SHOULDER: ICD-10-CM

## 2018-05-30 DIAGNOSIS — M54.2 PAIN, NECK: Primary | ICD-10-CM

## 2018-05-30 PROCEDURE — 97140 MANUAL THERAPY 1/> REGIONS: CPT | Performed by: PHYSICAL THERAPIST

## 2018-05-30 PROCEDURE — 97110 THERAPEUTIC EXERCISES: CPT | Performed by: PHYSICAL THERAPIST

## 2018-05-30 NOTE — PROGRESS NOTES
Physical Therapy Daily Progress Note  Visits:21    Subjective : Judith Peralta reports: I'm doing pretty well overall and having an improved function of the arm.  I do think the strength is getting better and I'm not as tired from doing the arm bike as I used to be.      Objective:   See Exercise, Manual, and Modality Logs for complete treatment.     Assessment/Plan:Pt did well with PT intervention today and we were able to complete all TE without noted increased symptoms.  She was able to perform all 15 reps of wall slides in flexion and scaption where she was previously unable to complete.      Progress per Plan of Care and Progress strengthening /stabilization /functional activity       Manual Therapy:    20     mins  48296;  Therapeutic Exercise:    40     mins  81367;     Neuromuscular Sommer:    -    mins  73764;    Therapeutic Activity:     -     mins  58742;     Gait Training:      -     mins  05334;     Ultrasound:     -     mins  16393;    Electrical Stimulation:    -     mins  03881 ( );  Dry Needling     -     mins self-pay    Timed Treatment:   60   mins   Total Treatment:     60   mins    MIKE Muñiz License #[id number]    Physical Therapist

## 2018-06-06 ENCOUNTER — TREATMENT (OUTPATIENT)
Dept: PHYSICAL THERAPY | Facility: CLINIC | Age: 43
End: 2018-06-06

## 2018-06-06 DIAGNOSIS — M25.512 ACUTE PAIN OF LEFT SHOULDER: ICD-10-CM

## 2018-06-06 DIAGNOSIS — M54.2 PAIN, NECK: Primary | ICD-10-CM

## 2018-06-06 PROCEDURE — 97110 THERAPEUTIC EXERCISES: CPT | Performed by: PHYSICAL THERAPIST

## 2018-06-06 PROCEDURE — 97140 MANUAL THERAPY 1/> REGIONS: CPT | Performed by: PHYSICAL THERAPIST

## 2018-06-06 NOTE — PROGRESS NOTES
Physical Therapy Daily Progress Note  Visits:22    Subjective : Judith Peralta reports: I had a really good weekend. I can feel that I am getting better and I don't fatigue as much and I can do more without much issue.      Objective:   See Exercise, Manual, and Modality Logs for complete treatment.     Assessment/Plan:Pt is tolerating TE and progression of strengthening very well.  Her mm guarding of the L upper trap and levator is much improved and decreased observable muscle bulk.  She has 4 visits remaining and we will continue to push toward functional strength and capacity prior to D/C.      Progress per Plan of Care and Progress strengthening /stabilization /functional activity       Manual Therapy:    15     mins  86255;  Therapeutic Exercise:    40     mins  79168;     Neuromuscular Sommer:    -    mins  50158;    Therapeutic Activity:     -     mins  92511;     Gait Training:      -     mins  23404;     Ultrasound:     -     mins  15702;    Electrical Stimulation:    -     mins  44959 ( );  Dry Needling     -     mins self-pay    Timed Treatment:   55   mins   Total Treatment:     55   mins    MIKE Muñiz License #[id number]    Physical Therapist

## 2018-06-08 ENCOUNTER — TREATMENT (OUTPATIENT)
Dept: PHYSICAL THERAPY | Facility: CLINIC | Age: 43
End: 2018-06-08

## 2018-06-08 DIAGNOSIS — M25.512 ACUTE PAIN OF LEFT SHOULDER: ICD-10-CM

## 2018-06-08 DIAGNOSIS — M54.2 PAIN, NECK: Primary | ICD-10-CM

## 2018-06-08 PROCEDURE — 97140 MANUAL THERAPY 1/> REGIONS: CPT | Performed by: PHYSICAL THERAPIST

## 2018-06-08 PROCEDURE — 97110 THERAPEUTIC EXERCISES: CPT | Performed by: PHYSICAL THERAPIST

## 2018-06-08 NOTE — PROGRESS NOTES
Physical Therapy Daily Progress Note  Visits:23    Subjective : Judith Peralta reports: I'm doing pretty good overall and was not too sore or anything after the last visit of strength training.      Objective   See Exercise, Manual, and Modality Logs for complete treatment.     Assessment/Plan:Pt continues to tolerate treatement and progression of functional strengthening.      Progress per Plan of Care and Progress strengthening /stabilization /functional activity       Manual Therapy:    15     mins  23569;  Therapeutic Exercise:    40     mins  93639;     Neuromuscular Sommer:    -    mins  54118;    Therapeutic Activity:     -     mins  40476;     Gait Training:      -     mins  73473;     Ultrasound:     -     mins  51649;    Electrical Stimulation:    -     mins  48677 ( );  Dry Needling     -     mins self-pay    Timed Treatment:   55   mins   Total Treatment:     55   mins    MIKE Muñiz License #[id number]    Physical Therapist

## 2018-06-11 ENCOUNTER — TREATMENT (OUTPATIENT)
Dept: PHYSICAL THERAPY | Facility: CLINIC | Age: 43
End: 2018-06-11

## 2018-06-11 DIAGNOSIS — M25.512 ACUTE PAIN OF LEFT SHOULDER: ICD-10-CM

## 2018-06-11 DIAGNOSIS — M54.2 PAIN, NECK: Primary | ICD-10-CM

## 2018-06-11 PROCEDURE — 97140 MANUAL THERAPY 1/> REGIONS: CPT | Performed by: PHYSICAL THERAPIST

## 2018-06-11 PROCEDURE — 97110 THERAPEUTIC EXERCISES: CPT | Performed by: PHYSICAL THERAPIST

## 2018-06-11 NOTE — PROGRESS NOTES
Physical Therapy Daily Progress Note  Visits:24    Subjective : Judith Peralta reports: I had a great weekend and really didn't have any issues.  I am very pleased with my progress and where I am in my recovery.      Objective   See Exercise, Manual, and Modality Logs for complete treatment.     Assessment/Plan:Pt has done very well with PT intervention and strength training and is within her last week of PT intervention.      Progress per Plan of Care and Progress strengthening /stabilization /functional activity       Manual Therapy:    15     mins  54483;  Therapeutic Exercise:    40     mins  68703;     Neuromuscular Sommer:    -    mins  73704;    Therapeutic Activity:     -     mins  85146;     Gait Training:      -     mins  26632;     Ultrasound:     -     mins  65473;    Electrical Stimulation:    -     mins  36807 ( );  Dry Needling     -     mins self-pay    Timed Treatment:   55   mins   Total Treatment:     55   mins    MIKE Muñiz License #[id number]    Physical Therapist

## 2018-06-14 ENCOUNTER — TREATMENT (OUTPATIENT)
Dept: PHYSICAL THERAPY | Facility: CLINIC | Age: 43
End: 2018-06-14

## 2018-06-14 DIAGNOSIS — M54.2 PAIN, NECK: Primary | ICD-10-CM

## 2018-06-14 DIAGNOSIS — M25.512 ACUTE PAIN OF LEFT SHOULDER: ICD-10-CM

## 2018-06-14 PROCEDURE — 97035 APP MDLTY 1+ULTRASOUND EA 15: CPT | Performed by: PHYSICAL THERAPIST

## 2018-06-14 PROCEDURE — 97110 THERAPEUTIC EXERCISES: CPT | Performed by: PHYSICAL THERAPIST

## 2018-06-14 NOTE — PROGRESS NOTES
Physical Therapy Daily Progress Note  Visits:25    Subjective : Judith Peralta reports: Doing pretty good overall and looking forward to having some time off from therapy to determine if I am ready to be done.      Objective:   See Exercise, Manual, and Modality Logs for complete treatment.     Assessment/Plan:Today's visit was to review all HEP to be continued over ~1.5 week break.  Upon return from vacation will re-assess for D/C.      Progress per Plan of Care and Progress strengthening /stabilization /functional activity     Manual Therapy:    -     mins  77933;  Therapeutic Exercise:    38     mins  95995;     Neuromuscular Sommer:    -    mins  28853;    Therapeutic Activity:     -     mins  29741;     Gait Training:      -     mins  71219;     Ultrasound:     8     mins  00671;    Electrical Stimulation:    -     mins  60661 ( );  Dry Needling     -     mins self-pay    Timed Treatment:   46   mins   Total Treatment:     46   mins    MIKE Muñiz License #[id number]    Physical Therapist

## 2018-06-26 ENCOUNTER — TREATMENT (OUTPATIENT)
Dept: PHYSICAL THERAPY | Facility: CLINIC | Age: 43
End: 2018-06-26

## 2018-06-26 ENCOUNTER — TRANSCRIBE ORDERS (OUTPATIENT)
Dept: PHYSICAL THERAPY | Facility: CLINIC | Age: 43
End: 2018-06-26

## 2018-06-26 DIAGNOSIS — M25.512 ACUTE PAIN OF LEFT SHOULDER: ICD-10-CM

## 2018-06-26 DIAGNOSIS — M54.2 PAIN, NECK: Primary | ICD-10-CM

## 2018-06-26 PROCEDURE — 97110 THERAPEUTIC EXERCISES: CPT | Performed by: PHYSICAL THERAPIST

## 2018-06-26 PROCEDURE — 97530 THERAPEUTIC ACTIVITIES: CPT | Performed by: PHYSICAL THERAPIST

## 2018-06-26 NOTE — PROGRESS NOTES
Physical Therapy Discharge Note  Visits:26    Subjective : Judith Peralta reports: I did well over the weekend and didn't have any major issues.  No pain currently.     Objective:   Active Range of Motion   Cervical/Thoracic Spine   Cervical     Flexion: 48 degrees,(at eval 29 degrees)  Extension: 45 degrees, (at eval: 20 degrees)   Left lateral flexion: 28degrees, (at eval: 31 degrees with pain)  Right lateral flexion: 35 degrees ( at eval: 24 degrees with pain)  Left rotation: 65 degress, (at eval: 40 degrees with pain)  Right rotation: 75 degrees, (at eval: 20 degrees with pain)     Thoracic   Flexion: WFL  Extension: WFL  Left lateral flexion: WFL  Right lateral flexion: WFL  Right rotation: WFL    Right Shoulder   Normal active range of motion     Passive Range of Motion   Left Shoulder   Flexion: 175 degrees, (at eval: 64 degrees with pain  Abduction: 170 degrees (at eval: 60 degrees     Additional Passive Range of Motion Details  Limited ER and IR at left shoulder and increased pain with movement.     Strength/Myotome Testing   Cervical Spine:   Neck extension: 5  Neck flexion: 5  Rotation: (B): 5   Sidebend: (B): 5     Left Shoulder      Planes of Motion   Flexion: 5   Extension: 5-  Abduction: 5-   External rotation at 0°: 5   Internal rotation at 0°: 5      Isolated Muscles   Biceps: 5    Right Shoulder      Planes of Motion   Flexion: 5   Extension: 5  Abduction: 5   Adduction:5   External rotation at 0°: 5   Internal rotation at 0°: 5      Isolated Muscles   Biceps: 5      Left Elbow   Flexion: 5  Extension: 5     Right Elbow   Flexion: 5  Extension: 5     Left Wrist/Hand   Wrist extension: 5  Wrist flexion: 5     Right Wrist/Hand   Wrist extension: 5  Wrist flexion: 5  See Exercise, Manual, and Modality Logs for complete treatment.     Assessment/Plan:Pt has done very well with PT intervention and will be discharged to [facility] due to meeting all established goals and recovering AROM of C-spine and shoulder  to WFL (WNL for shoulder).      Manual Therapy:    -     mins  56604;  Therapeutic Exercise:    35     mins  21096;     Neuromuscular Sommer:    -    mins  00173;    Therapeutic Activity:     12     mins  60228;   Tests and measures  Gait Training:      -     mins  29348;     Ultrasound:     -     mins  33129;    Electrical Stimulation:    -     mins  95608 ( );  Dry Needling     -     mins self-pay    Timed Treatment:   47   mins   Total Treatment:     47   mins    MIKE Muñiz License #[id number]    Physical Therapist